# Patient Record
Sex: MALE | Race: WHITE | NOT HISPANIC OR LATINO | Employment: UNEMPLOYED | URBAN - METROPOLITAN AREA
[De-identification: names, ages, dates, MRNs, and addresses within clinical notes are randomized per-mention and may not be internally consistent; named-entity substitution may affect disease eponyms.]

---

## 2024-10-19 ENCOUNTER — HOSPITAL ENCOUNTER (EMERGENCY)
Facility: HOSPITAL | Age: 29
Discharge: HOME | End: 2024-10-19
Attending: EMERGENCY MEDICINE
Payer: MEDICAID

## 2024-10-19 VITALS
WEIGHT: 160 LBS | HEIGHT: 62 IN | HEART RATE: 61 BPM | TEMPERATURE: 99 F | RESPIRATION RATE: 18 BRPM | BODY MASS INDEX: 29.44 KG/M2 | DIASTOLIC BLOOD PRESSURE: 95 MMHG | SYSTOLIC BLOOD PRESSURE: 150 MMHG | OXYGEN SATURATION: 97 %

## 2024-10-19 DIAGNOSIS — H92.01 OTALGIA OF RIGHT EAR: Primary | ICD-10-CM

## 2024-10-19 DIAGNOSIS — K02.9 DENTAL CARIES: ICD-10-CM

## 2024-10-19 PROCEDURE — 99284 EMERGENCY DEPT VISIT MOD MDM: CPT

## 2024-10-19 RX ORDER — CHLORHEXIDINE GLUCONATE ORAL RINSE 1.2 MG/ML
15 SOLUTION DENTAL 2 TIMES DAILY
Qty: 240 ML | Refills: 0 | Status: SHIPPED | OUTPATIENT
Start: 2024-10-19 | End: 2024-11-02

## 2024-10-19 RX ORDER — TRAMADOL HYDROCHLORIDE 50 MG/1
50 TABLET ORAL EVERY 6 HOURS PRN
Qty: 12 TABLET | Refills: 0 | Status: SHIPPED | OUTPATIENT
Start: 2024-10-19

## 2024-10-19 RX ORDER — AMOXICILLIN 875 MG/1
875 TABLET, FILM COATED ORAL 2 TIMES DAILY
Qty: 20 TABLET | Refills: 0 | Status: SHIPPED | OUTPATIENT
Start: 2024-10-19

## 2024-10-19 RX ORDER — IBUPROFEN 800 MG/1
800 TABLET ORAL EVERY 6 HOURS PRN
Qty: 20 TABLET | Refills: 0 | Status: SHIPPED | OUTPATIENT
Start: 2024-10-19

## 2024-10-19 NOTE — ED PROVIDER NOTES
Encounter Date: 10/19/2024       History     Chief Complaint   Patient presents with    Otalgia     R ear pain x 1 day      Right ear pain that is referred from multiple carious teeth.  He has not appointment with the dentist on Monday.  He denies any fever or facial swelling.  There was no drainage.  There was no trismus      Review of patient's allergies indicates:  No Known Allergies  History reviewed. No pertinent past medical history.  History reviewed. No pertinent surgical history.  No family history on file.  Social History     Tobacco Use    Smoking status: Former     Types: Cigarettes    Smokeless tobacco: Never     Review of Systems   Constitutional:  Negative for chills and fever.   HENT:  Positive for dental problem and ear pain. Negative for congestion, sore throat and trouble swallowing.    Eyes:  Negative for discharge and visual disturbance.   Respiratory:  Negative for cough and shortness of breath.    Cardiovascular:  Negative for chest pain and palpitations.   Gastrointestinal:  Negative for abdominal pain, diarrhea and vomiting.   Endocrine: Negative.    Genitourinary:  Negative for flank pain and hematuria.   Musculoskeletal:  Negative for myalgias.   Skin:  Negative for rash.   Neurological:  Negative for dizziness and headaches.   Psychiatric/Behavioral:  Negative for hallucinations and suicidal ideas.    All other systems reviewed and are negative.      Physical Exam     Initial Vitals [10/19/24 0747]   BP Pulse Resp Temp SpO2   (!) 150/95 61 18 98.6 °F (37 °C) 97 %      MAP       --         Physical Exam    Nursing note and vitals reviewed.  Constitutional: He appears well-developed and well-nourished. No distress.   HENT:   Head: Normocephalic and atraumatic. Mouth/Throat: Oropharynx is clear and moist and mucous membranes are normal. Abnormal dentition. Dental caries present.   Eyes: EOM are normal. Pupils are equal, round, and reactive to light.   Neck: Trachea normal. Neck supple.     Full passive range of motion without pain.     Cardiovascular:  Normal rate, regular rhythm and normal pulses.           Musculoskeletal:      Cervical back: Full passive range of motion without pain and neck supple.      Comments: No deformity, Nl ROM     Lymphadenopathy:     He has no cervical adenopathy.   Neurological: He is alert and oriented to person, place, and time. He has normal strength. GCS eye subscore is 4. GCS verbal subscore is 5. GCS motor subscore is 6.   Skin: Skin is warm and intact. Capillary refill takes less than 2 seconds.   Psychiatric: He is not actively hallucinating. He expresses no homicidal and no suicidal ideation.         ED Course   Procedures  Labs Reviewed - No data to display       Imaging Results    None          Medications - No data to display  Medical Decision Making  Patient not immunocompromised    No acute tooth fracture, avulsion, or bleeding socket is seen.    No RPA, PTA, Umberto's angina.    No gingival hyperplasia or concern for drug reaction.    We will treat with Ibuprofen, Tramadol and oral antibiotics.      Plan to discharge home.  We have discussed return precautions for increased facial swelling, pain, fever, other evidence of worsening odontogenic infections and other dental pain emergencies.  Will stress the importance of follow up with a dentist/OMFS of choice in 1 to 2 weeks                 ED Course as of 10/19/24 0802   Sat Oct 19, 2024   7949 I have reviewed the patient's Social Determinants of Health (SDOH), or non-medical factors that may impact their overall health. There are five classifications of SDOH, according to the [place] Office of Disease Prevention and Health Promotion:    Economic stability  Education  Health and healthcare  Neighborhood and built environment  Social and community context    After review of these five areas of determinants, I have not identified any specific barriers to healthcare delivery to this patient at this  time.    I have reviewed the nursing notes for the patient and I agree with the assessment in the record. [DB]   0752 The patient was questioned regarding their smoking habits, and I have determined, as the patient's treating physician, that there is a medical necessity in regard to the patient's medical condition to provide smoking cessation program education. The patient was advised to stop smoking and counseled for approximately 5 minutes. The patient was instructed to follow up with a primary care physician for smoking cessation and given information regarding local smoking cessation programs in the area. The patient received detailed discharge instructions as to how to stop smoking. The patient expressed an understanding of the need to follow up and the plan for smoking cessation.   [DB]      ED Course User Index  [DB] Rupert Villa MD                           Clinical Impression:  Final diagnoses:  [H92.01] Otalgia of right ear (Primary)  [K02.9] Dental caries          ED Disposition Condition    Discharge Stable          ED Prescriptions       Medication Sig Dispense Start Date End Date Auth. Provider    ibuprofen (ADVIL,MOTRIN) 800 MG tablet Take 1 tablet (800 mg total) by mouth every 6 (six) hours as needed for Pain. 20 tablet 10/19/2024 -- Rupert Villa MD    traMADoL (ULTRAM) 50 mg tablet Take 1 tablet (50 mg total) by mouth every 6 (six) hours as needed. 12 tablet 10/19/2024 -- Rupert Villa MD    amoxicillin (AMOXIL) 875 MG tablet Take 1 tablet (875 mg total) by mouth 2 (two) times daily. 20 tablet 10/19/2024 -- Rupert Villa MD    chlorhexidine (PERIDEX) 0.12 % solution Use as directed 15 mLs in the mouth or throat 2 (two) times daily. for 14 days 240 mL 10/19/2024 11/2/2024 Rupert Villa MD          Follow-up Information       Follow up With Specialties Details Why Contact Info    [facility] Surgery Go in 1 week For re-evaluation [address]  [facility] [id number]             Rupert Villa MD  10/19/24 0802